# Patient Record
Sex: MALE | Race: WHITE | Employment: OTHER | URBAN - METROPOLITAN AREA
[De-identification: names, ages, dates, MRNs, and addresses within clinical notes are randomized per-mention and may not be internally consistent; named-entity substitution may affect disease eponyms.]

---

## 2019-05-30 ENCOUNTER — NURSING HOME VISIT (OUTPATIENT)
Dept: GERIATRICS | Facility: CLINIC | Age: 74
End: 2019-05-30
Payer: COMMERCIAL

## 2019-05-30 VITALS
RESPIRATION RATE: 18 BRPM | OXYGEN SATURATION: 96 % | SYSTOLIC BLOOD PRESSURE: 108 MMHG | WEIGHT: 113 LBS | HEART RATE: 64 BPM | TEMPERATURE: 98.1 F | DIASTOLIC BLOOD PRESSURE: 64 MMHG

## 2019-05-30 DIAGNOSIS — J44.9 CHRONIC OBSTRUCTIVE PULMONARY DISEASE, UNSPECIFIED COPD TYPE (H): ICD-10-CM

## 2019-05-30 DIAGNOSIS — Z95.0 S/P PLACEMENT OF CARDIAC PACEMAKER: ICD-10-CM

## 2019-05-30 DIAGNOSIS — I25.10 CORONARY ARTERY DISEASE INVOLVING NATIVE HEART, ANGINA PRESENCE UNSPECIFIED, UNSPECIFIED VESSEL OR LESION TYPE: ICD-10-CM

## 2019-05-30 DIAGNOSIS — I50.9 CONGESTIVE HEART FAILURE, UNSPECIFIED HF CHRONICITY, UNSPECIFIED HEART FAILURE TYPE (H): Primary | ICD-10-CM

## 2019-05-30 DIAGNOSIS — Z72.0 TOBACCO ABUSE: ICD-10-CM

## 2019-05-30 DIAGNOSIS — R06.2 WHEEZING: ICD-10-CM

## 2019-05-30 DIAGNOSIS — K21.00 GASTROESOPHAGEAL REFLUX DISEASE WITH ESOPHAGITIS: ICD-10-CM

## 2019-05-30 DIAGNOSIS — N40.0 BENIGN PROSTATIC HYPERPLASIA, UNSPECIFIED WHETHER LOWER URINARY TRACT SYMPTOMS PRESENT: ICD-10-CM

## 2019-05-30 DIAGNOSIS — R41.0 DELIRIUM: ICD-10-CM

## 2019-05-30 DIAGNOSIS — I10 HYPERTENSION, UNSPECIFIED TYPE: ICD-10-CM

## 2019-05-30 DIAGNOSIS — E78.5 HYPERLIPIDEMIA, UNSPECIFIED HYPERLIPIDEMIA TYPE: ICD-10-CM

## 2019-05-30 PROBLEM — I70.90 ATHEROSCLEROSIS OF ARTERY: Status: ACTIVE | Noted: 2019-05-30

## 2019-05-30 PROCEDURE — 99310 SBSQ NF CARE HIGH MDM 45: CPT | Performed by: NURSE PRACTITIONER

## 2019-05-30 RX ORDER — FUROSEMIDE 20 MG
20 TABLET ORAL DAILY
Start: 2019-05-30

## 2019-05-30 RX ORDER — TAMSULOSIN HYDROCHLORIDE 0.4 MG/1
0.4 CAPSULE ORAL DAILY
Start: 2019-05-30

## 2019-05-30 RX ORDER — LISINOPRIL 2.5 MG/1
2.5 TABLET ORAL DAILY
Start: 2019-05-30

## 2019-05-30 RX ORDER — NITROGLYCERIN 0.4 MG/1
TABLET SUBLINGUAL
Start: 2019-05-30

## 2019-05-30 RX ORDER — QUETIAPINE FUMARATE 25 MG/1
50 TABLET, FILM COATED ORAL
Start: 2019-06-06 | End: 2019-06-06

## 2019-05-30 RX ORDER — TIOTROPIUM BROMIDE 18 UG/1
18 CAPSULE ORAL; RESPIRATORY (INHALATION) DAILY
Start: 2019-05-30

## 2019-05-30 RX ORDER — METOPROLOL SUCCINATE 50 MG/1
75 TABLET, EXTENDED RELEASE ORAL 2 TIMES DAILY
Start: 2019-05-30 | End: 2019-06-03

## 2019-05-30 RX ORDER — ATORVASTATIN CALCIUM 20 MG/1
40 TABLET, FILM COATED ORAL DAILY
Start: 2019-05-30

## 2019-05-30 RX ORDER — ALBUTEROL SULFATE 90 UG/1
2 AEROSOL, METERED RESPIRATORY (INHALATION) EVERY 4 HOURS PRN
Qty: 8.5 G
Start: 2019-05-30 | End: 2019-06-06

## 2019-05-30 RX ORDER — IPRATROPIUM BROMIDE AND ALBUTEROL SULFATE 2.5; .5 MG/3ML; MG/3ML
1 SOLUTION RESPIRATORY (INHALATION) 4 TIMES DAILY
Start: 2019-05-30 | End: 2019-06-03

## 2019-05-30 RX ORDER — METAPROTERENOL SULFATE 10 MG
1000 TABLET ORAL DAILY
Start: 2019-05-30

## 2019-05-30 RX ORDER — BUDESONIDE AND FORMOTEROL FUMARATE DIHYDRATE 160; 4.5 UG/1; UG/1
2 AEROSOL RESPIRATORY (INHALATION) 2 TIMES DAILY
Start: 2019-05-30

## 2019-05-30 RX ORDER — AMIODARONE HYDROCHLORIDE 400 MG/1
400 TABLET ORAL DAILY
Start: 2019-05-30

## 2019-05-30 RX ORDER — QUETIAPINE FUMARATE 25 MG/1
50 TABLET, FILM COATED ORAL DAILY
Qty: 14 TABLET | Refills: 0
Start: 2019-05-30 | End: 2019-06-06

## 2019-05-30 RX ORDER — OMEPRAZOLE 20 MG/1
20 TABLET, DELAYED RELEASE ORAL DAILY
Start: 2019-05-30

## 2019-05-30 RX ORDER — ACETAMINOPHEN 325 MG/1
650 TABLET ORAL EVERY 6 HOURS PRN
Start: 2019-05-30

## 2019-05-30 NOTE — PROGRESS NOTES
[place] [facility]  PRIMARY CARE PROVIDER AND CLINIC:  [facility], [address] / [facility] [address]  Chief Complaint   Patient presents with     Hospital F/U     [facility] Medical Record Number:  [id number]  Place of Service where encounter took place:  [facility] ([facility]) [[id number]]    Andrei Almanza  is a 73 year old  (1945), admitted to the above facility from  [facility]. Hospital stay 5/10/19 through 5/29/19..  Admitted to this facility for  rehab, medical management and nursing care.    HPI:    HPI information obtained from: facility chart records, facility staff, patient report, [facility] chart review and Care Everywhere [facility] chart review.   Brief Summary of Hospital Course: Andrei Almanza has a past medical history which is essentially unknown, as his primary clinic is the [facility] and he was hospitalized at [facility] in [facility].  He reports he was driving back from his cabin in [place] and they stopped at the [employer] in [place] to eat.  He reports he must have passed out there, is unclear what happened to him.  S/he was admitted to the hospital and presumed to have MI.  It appears that a PPM was placed but he is not sure if this is a defibrillator as well. Limited documents are available to me-essentially only a medication list was sent with him.  [facility] apparently does not participate in Care Everywhere, and no information from [facility] was obtained via Care Everywhere either. Past records from [facility] and [facility] have been requested.      Updates on Status Since Skilled nursing Admission: Andrei reports no new concerns.  He reports he slept well last night without difficulty, no chest pain, no cough, no congestion.  He reports he is a former smoker, now on nicotine patch and without symptoms of withdrawal.  He has a you cath bag with orders to do a voiding trial in about 1 week. He reports he had a BM.  Nursing reports no new  concerns.      CODE STATUS/ADVANCE DIRECTIVES DISCUSSION:   CPR/Full code   Patient's living condition: lives with spouse  ALLERGIES: Patient has no known allergies.  PAST MEDICAL HISTORY:  has a past medical history of BPH (benign prostatic hyperplasia), CAD (coronary artery disease), Chest pain, COPD (chronic obstructive pulmonary disease) (H), GERD (gastroesophageal reflux disease), HLD (hyperlipidemia), and HTN (hypertension).  PAST SURGICAL HISTORY:   has no past surgical history on file.  FAMILY HISTORY: family history is not on file.  SOCIAL HISTORY:   reports that he has been smoking.  He does not have any smokeless tobacco history on file. He reports that he does not drink alcohol or use drugs.    Post Discharge Medication Reconciliation Status: discharge medications reconciled and changed, per note/orders (see AVS)  Current Outpatient Medications   Medication Sig Dispense Refill     ASPIRIN PO Take 81 mg by mouth daily         ROS:  10 point ROS of systems including Constitutional, Eyes, Respiratory, Cardiovascular, Gastroenterology, Genitourinary, Integumentary, Musculoskeletal, Psychiatric were all negative except for pertinent positives noted in my HPI.    Vitals:  /64   Pulse 64   Temp 98.1  F (36.7  C)   Resp 18   Wt 51.3 kg (113 lb)   SpO2 96%   Exam:  GENERAL APPEARANCE:  Alert, in no distress, thin, alert  HEAD:  Normal, normocephalic, atraumatic  EYE EXAM: normal external eye, conjunctiva, lids, ROLY  NECK EXAM: supple, no JVD  CHEST/RESP:  respiratory effort normal, lung sounds CTA , no respiratory distress  CV:  Rate reg, rhythm reg, no murmur, no peripheral edema   M/S:   extremities normal, gait abnormal-walking with rolling walker, weak, kyphotic, normal muscle tone, and range of motion normal   SKIN EXAM: healing incision over L upper chest wall, about 5 cm in length, glued and without erythema or drainage, mild surrounding bruising  NEUROLOGIC EXAM: Normal gross motor movement,  tone and coordination. No tremor. Cranial nerves 2-12 are normal tested and grossly at patient's baseline  PSYCH:  Alert and oriented to person-place-time with some apparent memory loss, affect pleasant , judgement appropriate - following restrictions.      Lab/Diagnostic data:  no labs available to review    ASSESSMENT/PLAN:  Congestive heart failure, unspecified HF chronicity, unspecified heart failure type (H)  Coronary artery disease involving native heart, angina presence unspecified, unspecified vessel or lesion type  S/P placement of cardiac pacemaker  Patient with past medical history significant for CHF who had an unresponsive episode out of town, at [facility] for 19 days.  Apparently had PPM placed, unclear next steps as discharge papers are lacking.  He is on amiodarone, aspirin, lasix, metoprolol, statin, lisinopril.  He is having no chest pain today, weak.    -start therapy, with precautions per usual for ppm placement (no lifting x 10 pounds, no L arm over head)  -monitor vital signs, for changes  -obtain old records stat from [facility] and [facility]    Chronic obstructive pulmonary disease, unspecified COPD type (H)  Wheezing  Patient with apparent history of COPD, on prednisone (unclear if this is new or chronic), spiriva, symbicort, duoneb prn, albuterol inhaler prn.  Without symptoms today, and has a wilton patch in place to promote smoking cessation.     Benign prostatic hyperplasia, unspecified whether lower urinary tract symptoms present  Unclear if this is an old or new problem, currently has indwelling you cath, and is now on tamsulosin.  He cannot remember if he was on this in the past or not.  Awaiting records, will continue tamsulosin for now and monitor.  He is not symptomatic at this time.     Hyperlipidemia, unspecified hyperlipidemia type  On statin, unclear if this is new medication or old.     Hypertension, unspecified type  On lisinopril, lasix, metoprolol.  BP trending low  today.  Will monitor. May need to adjust medications.      Gastroesophageal reflux disease with esophagitis  On omeprazole, stable without symptoms today    Delirium  He is on seroquel for 7 days.  Unclear if this is old or new.  Will continue for now, suspect some hospital induced delirium.  Awaiting records.        transcribed by : Sheeba Haddad    1. Please obtain medical records from [facility] clinic  2. Starting 6/6/19 Seroquel 50 mg po at bedtime prn x 14 days Dx delirium  3. Diagnosis clarified on OSR  4. Labs 6/3 CMP and CBC Dx HTN, CAD, anemia  5. Please obtain [facility] discharge summary    Electronically signed by:  RADHA Miller CNP

## 2019-05-30 NOTE — LETTER
5/30/2019        RE: Andrei Almanza  [address] [facility] [address]        [place] [facility]  PRIMARY CARE PROVIDER AND CLINIC:  [facility], [address] / [facility] [address]  Chief Complaint   Patient presents with     Hospital F/U     [facility] Record Number:  [id number]  Place of Service where encounter took place:  [facility] ([facility]) [[id number]]    Andrei Almanza  is a 73 year old  (1945), admitted to the above facility from  [facility]. Hospital stay 5/10/19 through 5/29/19..  Admitted to this facility for  rehab, medical management and nursing care.    HPI:    HPI information obtained from: facility chart records, facility staff, patient report, [facility] chart review and Care Everywhere [facility] chart review.   Brief Summary of Hospital Course: Andrei Almanza has a past medical history which is essentially unknown, as his primary clinic is the [facility] and he was hospitalized at [facility] in [facility].  He reports he was driving back from his cabin in [place] and they stopped at the [employer] in [place] to eat.  He reports he must have passed out there, is unclear what happened to him.  S/he was admitted to the hospital and presumed to have MI.  It appears that a PPM was placed but he is not sure if this is a defibrillator as well. Limited documents are available to me-essentially only a medication list was sent with him.  [facility] apparently does not participate in Care Everywhere, and no information from [facility] was obtained via Care Everywhere either. Past records from [facility] and [facility] have been requested.      Updates on Status Since Skilled nursing Admission: Andrei reports no new concerns.  He reports he slept well last night without difficulty, no chest pain, no cough, no congestion.  He reports he is a former smoker, now on nicotine patch and without symptoms of withdrawal.  He has a you cath bag with  orders to do a voiding trial in about 1 week. He reports he had a BM.  Nursing reports no new concerns.      CODE STATUS/ADVANCE DIRECTIVES DISCUSSION:   CPR/Full code   Patient's living condition: lives with spouse  ALLERGIES: Patient has no known allergies.  PAST MEDICAL HISTORY:  has a past medical history of BPH (benign prostatic hyperplasia), CAD (coronary artery disease), Chest pain, COPD (chronic obstructive pulmonary disease) (H), GERD (gastroesophageal reflux disease), HLD (hyperlipidemia), and HTN (hypertension).  PAST SURGICAL HISTORY:   has no past surgical history on file.  FAMILY HISTORY: family history is not on file.  SOCIAL HISTORY:   reports that he has been smoking.  He does not have any smokeless tobacco history on file. He reports that he does not drink alcohol or use drugs.    Post Discharge Medication Reconciliation Status: discharge medications reconciled and changed, per note/orders (see AVS)  Current Outpatient Medications   Medication Sig Dispense Refill     ASPIRIN PO Take 81 mg by mouth daily         ROS:  10 point ROS of systems including Constitutional, Eyes, Respiratory, Cardiovascular, Gastroenterology, Genitourinary, Integumentary, Musculoskeletal, Psychiatric were all negative except for pertinent positives noted in my HPI.    Vitals:  /64   Pulse 64   Temp 98.1  F (36.7  C)   Resp 18   Wt 51.3 kg (113 lb)   SpO2 96%   Exam:  GENERAL APPEARANCE:  Alert, in no distress, thin, alert  HEAD:  Normal, normocephalic, atraumatic  EYE EXAM: normal external eye, conjunctiva, lids, ROLY  NECK EXAM: supple, no JVD  CHEST/RESP:  respiratory effort normal, lung sounds CTA , no respiratory distress  CV:  Rate reg, rhythm reg, no murmur, no peripheral edema   M/S:   extremities normal, gait abnormal-walking with rolling walker, weak, kyphotic, normal muscle tone, and range of motion normal   SKIN EXAM: healing incision over L upper chest wall, about 5 cm in length, glued and without  erythema or drainage, mild surrounding bruising  NEUROLOGIC EXAM: Normal gross motor movement, tone and coordination. No tremor. Cranial nerves 2-12 are normal tested and grossly at patient's baseline  PSYCH:  Alert and oriented to person-place-time with some apparent memory loss, affect pleasant , judgement appropriate - following restrictions.      Lab/Diagnostic data:  no labs available to review    ASSESSMENT/PLAN:  Congestive heart failure, unspecified HF chronicity, unspecified heart failure type (H)  Coronary artery disease involving native heart, angina presence unspecified, unspecified vessel or lesion type  S/P placement of cardiac pacemaker  Patient with past medical history significant for CHF who had an unresponsive episode out of town, at [facility] for 19 days.  Apparently had PPM placed, unclear next steps as discharge papers are lacking.  He is on amiodarone, aspirin, lasix, metoprolol, statin, lisinopril.  He is having no chest pain today, weak.    -start therapy, with precautions per usual for ppm placement (no lifting x 10 pounds, no L arm over head)  -monitor vital signs, for changes  -obtain old records stat from [facility] and [facility]    Chronic obstructive pulmonary disease, unspecified COPD type (H)  Wheezing  Patient with apparent history of COPD, on prednisone (unclear if this is new or chronic), spiriva, symbicort, duoneb prn, albuterol inhaler prn.  Without symptoms today, and has a wilton patch in place to promote smoking cessation.     Benign prostatic hyperplasia, unspecified whether lower urinary tract symptoms present  Unclear if this is an old or new problem, currently has indwelling you cath, and is now on tamsulosin.  He cannot remember if he was on this in the past or not.  Awaiting records, will continue tamsulosin for now and monitor.  He is not symptomatic at this time.     Hyperlipidemia, unspecified hyperlipidemia type  On statin, unclear if this is new medication or  old.     Hypertension, unspecified type  On lisinopril, lasix, metoprolol.  BP trending low today.  Will monitor. May need to adjust medications.      Gastroesophageal reflux disease with esophagitis  On omeprazole, stable without symptoms today    Delirium  He is on seroquel for 7 days.  Unclear if this is old or new.  Will continue for now, suspect some hospital induced delirium.  Awaiting records.        transcribed by : Sheeba Haddad    1. Please obtain medical records from [facility] clinic  2. Starting 6/6/19 Seroquel 50 mg po at bedtime prn x 14 days Dx delirium  3. Diagnosis clarified on OSR  4. Labs 6/3 CMP and CBC Dx HTN, CAD, anemia  5. Please obtain [facility] discharge summary    Electronically signed by:  RADHA Miller CNP                         Sincerely,        RADHA Miller CNP

## 2019-06-03 ENCOUNTER — NURSING HOME VISIT (OUTPATIENT)
Dept: GERIATRICS | Facility: CLINIC | Age: 74
End: 2019-06-03
Payer: COMMERCIAL

## 2019-06-03 VITALS
DIASTOLIC BLOOD PRESSURE: 59 MMHG | RESPIRATION RATE: 18 BRPM | TEMPERATURE: 97.9 F | WEIGHT: 113 LBS | HEART RATE: 77 BPM | OXYGEN SATURATION: 97 % | SYSTOLIC BLOOD PRESSURE: 97 MMHG

## 2019-06-03 DIAGNOSIS — J44.9 CHRONIC OBSTRUCTIVE PULMONARY DISEASE, UNSPECIFIED COPD TYPE (H): ICD-10-CM

## 2019-06-03 DIAGNOSIS — I25.10 CORONARY ARTERY DISEASE INVOLVING NATIVE HEART, ANGINA PRESENCE UNSPECIFIED, UNSPECIFIED VESSEL OR LESION TYPE: Primary | ICD-10-CM

## 2019-06-03 DIAGNOSIS — R33.9 URINARY RETENTION: ICD-10-CM

## 2019-06-03 DIAGNOSIS — I10 HYPERTENSION, UNSPECIFIED TYPE: ICD-10-CM

## 2019-06-03 PROCEDURE — 99309 SBSQ NF CARE MODERATE MDM 30: CPT | Performed by: NURSE PRACTITIONER

## 2019-06-03 RX ORDER — METOPROLOL SUCCINATE 50 MG/1
75 TABLET, EXTENDED RELEASE ORAL DAILY
Start: 2019-06-03

## 2019-06-03 RX ORDER — IPRATROPIUM BROMIDE AND ALBUTEROL SULFATE 2.5; .5 MG/3ML; MG/3ML
1 SOLUTION RESPIRATORY (INHALATION) EVERY 4 HOURS PRN
Start: 2019-06-03 | End: 2019-06-06

## 2019-06-03 NOTE — LETTER
6/3/2019        RE: Andrei Almanza  [address] [facility] [address]        [place] [facility]  [place] Medical Record Number:  [id number]  Place of Service where encounter took place:  [facility] ([facility]) [[id number]]  Chief Complaint   Patient presents with     Nursing Home Acute       HPI:    Andrei Almanza  is a 73 year old (1945), who is being seen today for an episodic care visit.  HPI information obtained from: facility chart records, facility staff, patient report and [facility] chart review. Today's concern is:     Coronary artery disease involving native heart, angina presence unspecified, unspecified vessel or lesion type  Hypertension, unspecified type  Urinary retention  Chronic obstructive pulmonary disease, unspecified COPD type (H)   Andrei reports he would like to discharge to home soon, feeling well.  Nursing reports no new concerns but does still have you cath, still on q4h duonebs.       Past Medical and Surgical History reviewed in Epic today.    MEDICATIONS:  Current Outpatient Medications   Medication Sig Dispense Refill     acetaminophen (TYLENOL) 325 MG tablet Take 2 tablets (650 mg) by mouth every 6 hours as needed for mild pain       albuterol (PROAIR HFA/PROVENTIL HFA/VENTOLIN HFA) 108 (90 Base) MCG/ACT inhaler Inhale 2 puffs into the lungs every 4 hours as needed for shortness of breath / dyspnea or wheezing 8.5 g      amiodarone (PACERONE) 400 MG tablet Take 1 tablet (400 mg) by mouth daily       ASPIRIN PO Take 81 mg by mouth daily       atorvastatin (LIPITOR) 20 MG tablet Take 2 tablets (40 mg) by mouth daily       budesonide-formoterol (SYMBICORT) 160-4.5 MCG/ACT Inhaler Inhale 2 puffs into the lungs 2 times daily       furosemide (LASIX) 20 MG tablet Take 1 tablet (20 mg) by mouth daily       lisinopril (PRINIVIL/ZESTRIL) 2.5 MG tablet Take 1 tablet (2.5 mg) by mouth daily       magnesium oxide (MAG-OX) 400 (241.3 Mg) MG tablet  Take 1 tablet (400 mg) by mouth daily       nicotine (NICODERM CQ) 7 MG/24HR 24 hr patch Place 1 patch onto the skin every 24 hours       nitroGLYcerin (NITROSTAT) 0.4 MG sublingual tablet For chest pain place 1 tablet under the tongue every 5 minutes for 3 doses. If symptoms persist 5 minutes after 1st dose call 911.       Omega-3 Fish Oil 500 MG capsule Take 2 capsules (1,000 mg) by mouth daily       omeprazole (PRILOSEC OTC) 20 MG EC tablet Take 1 tablet (20 mg) by mouth daily       QUEtiapine (SEROQUEL) 25 MG tablet Take 2 tablets (50 mg) by mouth daily for 7 days 14 tablet 0     [START ON 6/6/2019] QUEtiapine (SEROQUEL) 25 MG tablet Take 2 tablets (50 mg) by mouth nightly as needed (delirium)       tamsulosin (FLOMAX) 0.4 MG capsule Take 1 capsule (0.4 mg) by mouth daily       tiotropium (SPIRIVA) 18 MCG inhaled capsule Inhale 1 capsule (18 mcg) into the lungs daily         REVIEW OF SYSTEMS:  4 point ROS including Respiratory, CV, GI and , other than that noted in the HPI,  is negative    Objective:  BP 97/59   Pulse 77   Temp 97.9  F (36.6  C)   Resp 18   Wt 51.3 kg (113 lb)   SpO2 97%   Exam:  GENERAL APPEARANCE:  Alert, in no distress   HEAD:  Normal, normocephalic, atraumatic  EYE EXAM: normal external eye, conjunctiva, lids, ROLY  NECK EXAM: supple, no JVD  CHEST/RESP:  respiratory effort normal, lung sounds CTA , no respiratory distress  CV:  Rate reg, rhythm reg, no murmur, no peripheral edema   M/S:   extremities normal, gait normal-with rolling walker , normal muscle tone, and range of motion normal   NEUROLOGIC EXAM: Normal gross motor movement, tone and coordination. No tremor. Cranial nerves 2-12 are normal tested and grossly at patient's baseline  PSYCH:  Alert and oriented to person-place-time , affect pleasant , judgement appropriate      Labs:   None     ASSESSMENT/PLAN:  Coronary artery disease involving native heart, angina presence unspecified, unspecified vessel or lesion type  With  new ICD implanted and still have not obtained discharge summary from [facility] in [place].  Unclear exact history, but ultimately patient is feeling well and improving in strength and stamina.   -continue therapy and current medical management, progressing     Hypertension, unspecified type  BP trending low, on lisinopril, metoprolol.  Will decrease metoprolol, monitor HR and BP.   BP Readings from Last 6 Encounters:   06/03/19 97/59   05/30/19 108/64     Pulse Readings from Last 4 Encounters:   06/03/19 77   05/30/19 64        Urinary retention  Still with indwelling you but is feeling urge to urinate.  He would like to go home at the end of the week.  -discontinue you, voiding trial per protocol on [facility]  -update NP if unsuccessful    Chronic obstructive pulmonary disease, unspecified COPD type (H)  Patient with known history of smoking, no recent exacerbations.  He cannot remember his medications, does not think he was on inhalers.  He has been on q4h duonebs since admission, with no increase in dyspnea.  Will discontinue scheduled duonebs, monitor dyspnea, symptoms.        transcribed by : Sheeba Haddad  1. Discontinue scheduled duonebs  2. duonebs Q 4 hr prn wheezing and sob  3. Discontinue metoprolol 75 BID  4. Metoprolol succinate 75 mg po every day Dx HTN  5. Start voiding trial now 6/3/19 - pull you cath, push fluids and monitor voiding and PVR, straight cath if > 350 ml urine after voiding    Electronically signed by:  RADHA Miller CNP               Sincerely,        RADHA Miller CNP

## 2019-06-03 NOTE — PROGRESS NOTES
[place] [facility]  [place] Medical Record Number:  [id number]  Place of Service where encounter took place:  [facility] ([facility]) [[id number]]  Chief Complaint   Patient presents with     Nursing Home Acute       HPI:    Andrei Almanza  is a 73 year old (1945), who is being seen today for an episodic care visit.  HPI information obtained from: facility chart records, facility staff, patient report and [facility] chart review. Today's concern is:     Coronary artery disease involving native heart, angina presence unspecified, unspecified vessel or lesion type  Hypertension, unspecified type  Urinary retention  Chronic obstructive pulmonary disease, unspecified COPD type (H)   Andrei reports he would like to discharge to home soon, feeling well.  Nursing reports no new concerns but does still have you cath, still on q4h duonebs.       Past Medical and Surgical History reviewed in Epic today.    MEDICATIONS:  Current Outpatient Medications   Medication Sig Dispense Refill     acetaminophen (TYLENOL) 325 MG tablet Take 2 tablets (650 mg) by mouth every 6 hours as needed for mild pain       albuterol (PROAIR HFA/PROVENTIL HFA/VENTOLIN HFA) 108 (90 Base) MCG/ACT inhaler Inhale 2 puffs into the lungs every 4 hours as needed for shortness of breath / dyspnea or wheezing 8.5 g      amiodarone (PACERONE) 400 MG tablet Take 1 tablet (400 mg) by mouth daily       ASPIRIN PO Take 81 mg by mouth daily       atorvastatin (LIPITOR) 20 MG tablet Take 2 tablets (40 mg) by mouth daily       budesonide-formoterol (SYMBICORT) 160-4.5 MCG/ACT Inhaler Inhale 2 puffs into the lungs 2 times daily       furosemide (LASIX) 20 MG tablet Take 1 tablet (20 mg) by mouth daily       lisinopril (PRINIVIL/ZESTRIL) 2.5 MG tablet Take 1 tablet (2.5 mg) by mouth daily       magnesium oxide (MAG-OX) 400 (241.3 Mg) MG tablet Take 1 tablet (400 mg) by mouth daily       nicotine (NICODERM CQ) 7 MG/24HR 24 hr patch Place 1  patch onto the skin every 24 hours       nitroGLYcerin (NITROSTAT) 0.4 MG sublingual tablet For chest pain place 1 tablet under the tongue every 5 minutes for 3 doses. If symptoms persist 5 minutes after 1st dose call 911.       Omega-3 Fish Oil 500 MG capsule Take 2 capsules (1,000 mg) by mouth daily       omeprazole (PRILOSEC OTC) 20 MG EC tablet Take 1 tablet (20 mg) by mouth daily       QUEtiapine (SEROQUEL) 25 MG tablet Take 2 tablets (50 mg) by mouth daily for 7 days 14 tablet 0     [START ON 6/6/2019] QUEtiapine (SEROQUEL) 25 MG tablet Take 2 tablets (50 mg) by mouth nightly as needed (delirium)       tamsulosin (FLOMAX) 0.4 MG capsule Take 1 capsule (0.4 mg) by mouth daily       tiotropium (SPIRIVA) 18 MCG inhaled capsule Inhale 1 capsule (18 mcg) into the lungs daily         REVIEW OF SYSTEMS:  4 point ROS including Respiratory, CV, GI and , other than that noted in the HPI,  is negative    Objective:  BP 97/59   Pulse 77   Temp 97.9  F (36.6  C)   Resp 18   Wt 51.3 kg (113 lb)   SpO2 97%   Exam:  GENERAL APPEARANCE:  Alert, in no distress   HEAD:  Normal, normocephalic, atraumatic  EYE EXAM: normal external eye, conjunctiva, lids, ROLY  NECK EXAM: supple, no JVD  CHEST/RESP:  respiratory effort normal, lung sounds CTA , no respiratory distress  CV:  Rate reg, rhythm reg, no murmur, no peripheral edema   M/S:   extremities normal, gait normal-with rolling walker , normal muscle tone, and range of motion normal   NEUROLOGIC EXAM: Normal gross motor movement, tone and coordination. No tremor. Cranial nerves 2-12 are normal tested and grossly at patient's baseline  PSYCH:  Alert and oriented to person-place-time , affect pleasant , judgement appropriate      Labs:   None     ASSESSMENT/PLAN:  Coronary artery disease involving native heart, angina presence unspecified, unspecified vessel or lesion type  With new ICD implanted and still have not obtained discharge summary from [facility] in [place].   Unclear exact history, but ultimately patient is feeling well and improving in strength and stamina.   -continue therapy and current medical management, progressing     Hypertension, unspecified type  BP trending low, on lisinopril, metoprolol.  Will decrease metoprolol, monitor HR and BP.   BP Readings from Last 6 Encounters:   06/03/19 97/59   05/30/19 108/64     Pulse Readings from Last 4 Encounters:   06/03/19 77   05/30/19 64        Urinary retention  Still with indwelling you but is feeling urge to urinate.  He would like to go home at the end of the week.  -discontinue you, voiding trial per protocol on [facility]  -update NP if unsuccessful    Chronic obstructive pulmonary disease, unspecified COPD type (H)  Patient with known history of smoking, no recent exacerbations.  He cannot remember his medications, does not think he was on inhalers.  He has been on q4h duonebs since admission, with no increase in dyspnea.  Will discontinue scheduled duonebs, monitor dyspnea, symptoms.        transcribed by : Sheeba Haddad  1. Discontinue scheduled duonebs  2. duonebs Q 4 hr prn wheezing and sob  3. Discontinue metoprolol 75 BID  4. Metoprolol succinate 75 mg po every day Dx HTN  5. Start voiding trial now 6/3/19 - pull you cath, push fluids and monitor voiding and PVR, straight cath if > 350 ml urine after voiding    Electronically signed by:  RADHA Miller CNP

## 2019-06-06 ENCOUNTER — DISCHARGE SUMMARY NURSING HOME (OUTPATIENT)
Dept: GERIATRICS | Facility: CLINIC | Age: 74
End: 2019-06-06
Payer: COMMERCIAL

## 2019-06-06 VITALS
WEIGHT: 122 LBS | TEMPERATURE: 97.9 F | RESPIRATION RATE: 18 BRPM | DIASTOLIC BLOOD PRESSURE: 78 MMHG | SYSTOLIC BLOOD PRESSURE: 126 MMHG | HEART RATE: 75 BPM | OXYGEN SATURATION: 99 %

## 2019-06-06 DIAGNOSIS — N40.0 BENIGN PROSTATIC HYPERPLASIA, UNSPECIFIED WHETHER LOWER URINARY TRACT SYMPTOMS PRESENT: ICD-10-CM

## 2019-06-06 DIAGNOSIS — Z72.0 TOBACCO ABUSE: ICD-10-CM

## 2019-06-06 DIAGNOSIS — J44.9 CHRONIC OBSTRUCTIVE PULMONARY DISEASE, UNSPECIFIED COPD TYPE (H): ICD-10-CM

## 2019-06-06 DIAGNOSIS — I50.9 CONGESTIVE HEART FAILURE, UNSPECIFIED HF CHRONICITY, UNSPECIFIED HEART FAILURE TYPE (H): ICD-10-CM

## 2019-06-06 DIAGNOSIS — I25.10 CORONARY ARTERY DISEASE INVOLVING NATIVE HEART, ANGINA PRESENCE UNSPECIFIED, UNSPECIFIED VESSEL OR LESION TYPE: ICD-10-CM

## 2019-06-06 DIAGNOSIS — I46.9 CARDIAC ARREST (H): Primary | ICD-10-CM

## 2019-06-06 DIAGNOSIS — Z95.810 ICD (IMPLANTABLE CARDIOVERTER-DEFIBRILLATOR) IN PLACE: ICD-10-CM

## 2019-06-06 DIAGNOSIS — R41.0 DELIRIUM: ICD-10-CM

## 2019-06-06 DIAGNOSIS — I10 HYPERTENSION, UNSPECIFIED TYPE: ICD-10-CM

## 2019-06-06 DIAGNOSIS — E78.5 HYPERLIPIDEMIA, UNSPECIFIED HYPERLIPIDEMIA TYPE: ICD-10-CM

## 2019-06-06 DIAGNOSIS — R33.9 URINARY RETENTION: ICD-10-CM

## 2019-06-06 PROCEDURE — 99316 NF DSCHRG MGMT 30 MIN+: CPT | Performed by: NURSE PRACTITIONER

## 2019-06-06 NOTE — LETTER
6/6/2019        RE: Andrei Almanza  [address] [facility] [address]        Home GERIATRIC SERVICES DISCHARGE SUMMARY  PATIENT'S NAME: Andrei Almanza  YOB: 1945  MEDICAL RECORD NUMBER:  [id number]  Place of Service where encounter took place:  [facility] [[id number]]    PRIMARY CARE PROVIDER AND CLINIC RESPONSIBLE AFTER TRANSFER:   [facility], [address] / [facility] [address]    Non-FMG Provider     Transferring providers: RADHA Miller CNP  Recent Hospitalization/ED:  [facility] stay 5/10/19 to 5/29/19.  Date of SNF Admission: May / 29 / 2019  Date of SNF (anticipated) Discharge: June / 07 / 2019  Discharged to: previous independent home  Cognitive Scores: SLUMS: 22/30  Physical Function: Ambulating 200 ft with 4 wheeled walker  DME: Walker    CODE STATUS/ADVANCE DIRECTIVES DISCUSSION:  Full Code   ALLERGIES: Patient has no known allergies.    Copied from hospital discharge summary ([place], [place])  for convenience of PCP in arranging follow up care.       DISCHARGE DIAGNOSIS/NURSING FACILITY COURSE:   Cardiac arrest (H)  Patient suffered a witnessed cardiac arrest at [facility] in [place].  He and his wife were driving back from their [place] cabin, and had stopped for coffee.  He suddenly fell face forward in his food, and had complete cardiac arrest.  Bystanders started CPR until EMTs arrived.  He was transported to hospital.  He was intubated and gradually improved.  Had ICD placed 5/2019.  His hospital stay was complicated by infiltrates on CXR-had antibiotics, steroids as well as urinary retention, encephalopathy.   -on amiodarone, statin, metoprolol  -home care RN, PT, OT  -will need cardiac rehab after home care is done    ICD (implantable cardioverter-defibrillator) in place  S/P ICD placed and incision is well healed.  He has documentation with him that identifies it.  -cardiology  referral to determine next steps, establish care    Congestive heart failure, unspecified HF chronicity, unspecified heart failure type (H)  Noted to have EF 30-35% with global hypokinesis.  On lasix without edema, no dyspnea.   -cardiology referral    Coronary artery disease involving native heart, angina presence unspecified, unspecified vessel or lesion type  S/P CAD and stents 10 years ago.  This history is not available to me.     Chronic obstructive pulmonary disease, unspecified COPD type (H)  Patient with history of smoking, on symbicort, spiriva.  Was admitted to TCU on duonebs, tolerated discontinuation of these in TCU and is asymptomatic.     Hypertension, unspecified type  Patient with history of HTN, on lisinopril, metoprolol. Stable now.   BP Readings from Last 6 Encounters:   06/06/19 126/78   06/03/19 97/59   05/30/19 108/64   02/11/15 109/74   04/16/14 130/78      Urinary retention  Benign prostatic hyperplasia, unspecified whether lower urinary tract symptoms present  Prior to cardiac arrest, patient reports no difficulty with urination.  During hospital stay, developed urinary retention and you was placed.  He has now tolerated removal of you and is urinating without difficulty.     Hyperlipidemia, unspecified hyperlipidemia type  On statin.  No recent labwork available. Will defer to PCP for ongoing management.     Delirium  Patient developed encephalopathy during hospital stay, now has tolerated discontinuation of seroquel and is alert, oriented, sleeping well.     Tobacco abuse  Patient previously smoking 0.5 ppd, no smoking since cardiac arrest.  He has been on nicotine patch, and is interested in complete smoking cessation. Instructed to continue nicotine patch for about 1 month.       Past Medical History:  has a past medical history of BPH (benign prostatic hyperplasia), CAD (coronary artery disease), Chest pain, COPD (chronic obstructive pulmonary disease) (H), GERD (gastroesophageal  reflux disease), HLD (hyperlipidemia), and HTN (hypertension).    Discharge Medications:  Current Outpatient Medications   Medication Sig Dispense Refill     acetaminophen (TYLENOL) 325 MG tablet Take 2 tablets (650 mg) by mouth every 6 hours as needed for mild pain       amiodarone (PACERONE) 400 MG tablet Take 1 tablet (400 mg) by mouth daily       ASPIRIN PO Take 81 mg by mouth daily       atorvastatin (LIPITOR) 20 MG tablet Take 2 tablets (40 mg) by mouth daily       budesonide-formoterol (SYMBICORT) 160-4.5 MCG/ACT Inhaler Inhale 2 puffs into the lungs 2 times daily       furosemide (LASIX) 20 MG tablet Take 1 tablet (20 mg) by mouth daily       lisinopril (PRINIVIL/ZESTRIL) 2.5 MG tablet Take 1 tablet (2.5 mg) by mouth daily       magnesium oxide (MAG-OX) 400 (241.3 Mg) MG tablet Take 1 tablet (400 mg) by mouth daily       metoprolol succinate ER (TOPROL-XL) 50 MG 24 hr tablet Take 1.5 tablets (75 mg) by mouth daily       nicotine (NICODERM CQ) 7 MG/24HR 24 hr patch Place 1 patch onto the skin every 24 hours       nitroGLYcerin (NITROSTAT) 0.4 MG sublingual tablet For chest pain place 1 tablet under the tongue every 5 minutes for 3 doses. If symptoms persist 5 minutes after 1st dose call 911.       Omega-3 Fish Oil 500 MG capsule Take 2 capsules (1,000 mg) by mouth daily       omeprazole (PRILOSEC OTC) 20 MG EC tablet Take 1 tablet (20 mg) by mouth daily       tamsulosin (FLOMAX) 0.4 MG capsule Take 1 capsule (0.4 mg) by mouth daily       tiotropium (SPIRIVA) 18 MCG inhaled capsule Inhale 1 capsule (18 mcg) into the lungs daily       Medication Changes/Rationale:     seroquel discontinued    Metoprolol decreased to 75 mg daily (previously BID) due to low BP    Controlled medications sent with patient:   not applicable/none     ROS:   4 point ROS including Respiratory, CV, GI and , other than that noted in the HPI,  is negative    Physical Exam:   Vitals: /78   Pulse 75   Temp 97.9  F (36.6  C)    Resp 18   Wt 55.3 kg (122 lb)   SpO2 99%   BMI= There is no height or weight on file to calculate BMI.  GENERAL APPEARANCE:  Alert, in no distress   CHEST/RESP:  respiratory effort normal, lung sounds CTA , no respiratory distress  CV:  Rate reg, rhythm reg, no murmur, no peripheral edema   PSYCH:  Alert and oriented to person-place-time , affect pleasant , judgement appropriate       SNF labs: no labs done at TCU    DISCHARGE PLAN:    Follow up labs: No labs orders/due    Medical Follow Up:      Follow up with primary care provider in 5-7 days    MT referral needed and placed by this provider: No    Current [place] scheduled appointments:   NA    Discharge Services: Home Care:  Occupational Therapy, Physical Therapy, Registered Nurse, Home Health Aide and From:  [facility]    Discharge Instructions Verbalized to Patient at Discharge:     None      1. Discontinue the following- duonebs, alb nebs, MOM bisacodyl, seroquel  2. Patient may discharge to home with all current meds and treatments  3. Please send all meds home with patient.  4. F/U with PCP in 5-7 days - [facility]  5. Okay for [facility] RN/PT/OT/HHA to eval and treat.  6. Cardiac rehab after home care is done.        TOTAL DISCHARGE TIME:   Greater than 30 minutes  Electronically signed by:  RADHA Miller CNP     Documentation of Face to Face and Certification for Home Health Services    I certify that patient: Andrei Almanza is under my care and that I, or a nurse practitioner or physician's assistant working with me, had a face-to-face encounter that meets the physician face-to-face encounter requirements with this patient on: 6/6/2019.    This encounter with the patient was in whole, or in part, for the following medical condition, which is the primary reason for home health care: CHF, Pacemaker placement.    I certify that, based on my findings, the following services are medically necessary home health services:  Nursing, Occupational Therapy, Physical Therapy and HHA.    My clinical findings support the need for the above services because: Nurse is needed: To provide caregiver training to assist with: CHF, Pacemaker placement..., Occupational Therapy Services are needed to assess and treat cognitive ability and address ADL safety due to impairment in CHF, Pacemaker placement.. and Physical Therapy Services are needed to assess and treat the following functional impairments: CHF, Pacemaker placement..    Further, I certify that my clinical findings support that this patient is homebound (i.e. absences from home require considerable and taxing effort and are for medical reasons or Sabianist services or infrequently or of short duration when for other reasons) because: Requires assistance of another person or specialized equipment to access medical services because patient: Requires supervision of another for safe transfer...    Based on the above findings. I certify that this patient is confined to the home and needs intermittent skilled nursing care, physical therapy and/or speech therapy.  The patient is under my care, and I have initiated the establishment of the plan of care.  This patient will be followed by a physician who will periodically review the plan of care.  Physician/Provider to provide follow up care: [facility]    Attending hospital physician (the Medicare certified [facility] provider): RADHA Miller CNP   Physician Signature: See electronic signature associated with these discharge orders.  Date: 6/6/2019                    Sincerely,        RADHA Miller CNP

## 2019-06-06 NOTE — PROGRESS NOTES
[place] [facility]IATRIC [facility] DISCHARGE SUMMARY  PATIENT'S NAME: Andrei Almanza  YOB: 1945  MEDICAL RECORD NUMBER:  [id number]  Place of Service where encounter took place:  [facility] [[id number]]    PRIMARY CARE PROVIDER AND CLINIC RESPONSIBLE AFTER TRANSFER:   [facility], [address] / [facility] [address]    Non-FMG Provider     Transferring providers: RADHA Miller CNP  Recent Hospitalization/ED:  [facility] stay 5/10/19 to 5/29/19.  Date of SNF Admission: May / 29 / 2019  Date of SNF (anticipated) Discharge: June / 07 / 2019  Discharged to: previous independent home  Cognitive Scores: SLUMS: 22/30  Physical Function: Ambulating 200 ft with 4 wheeled walker  DME: Walker    CODE STATUS/ADVANCE DIRECTIVES DISCUSSION:  Full Code   ALLERGIES: Patient has no known allergies.    Copied from hospital discharge summary ([place], [place])  for convenience of PCP in arranging follow up care.       DISCHARGE DIAGNOSIS/NURSING FACILITY COURSE:   Cardiac arrest (H)  Patient suffered a witnessed cardiac arrest at [facility] in [place].  He and his wife were driving back from their [place] cabin, and had stopped for coffee.  He suddenly fell face forward in his food, and had complete cardiac arrest.  Bystanders started CPR until EMTs arrived.  He was transported to hospital.  He was intubated and gradually improved.  Had ICD placed 5/2019.  His hospital stay was complicated by infiltrates on CXR-had antibiotics, steroids as well as urinary retention, encephalopathy.   -on amiodarone, statin, metoprolol  -home care RN, PT, OT  -will need cardiac rehab after home care is done    ICD (implantable cardioverter-defibrillator) in place  S/P ICD placed and incision is well healed.  He has documentation with him that identifies it.  -cardiology referral to determine next steps, establish care    Congestive heart failure, unspecified HF  chronicity, unspecified heart failure type (H)  Noted to have EF 30-35% with global hypokinesis.  On lasix without edema, no dyspnea.   -cardiology referral    Coronary artery disease involving native heart, angina presence unspecified, unspecified vessel or lesion type  S/P CAD and stents 10 years ago.  This history is not available to me.     Chronic obstructive pulmonary disease, unspecified COPD type (H)  Patient with history of smoking, on symbicort, spiriva.  Was admitted to TCU on duonebs, tolerated discontinuation of these in TCU and is asymptomatic.     Hypertension, unspecified type  Patient with history of HTN, on lisinopril, metoprolol. Stable now.   BP Readings from Last 6 Encounters:   06/06/19 126/78   06/03/19 97/59   05/30/19 108/64   02/11/15 109/74   04/16/14 130/78      Urinary retention  Benign prostatic hyperplasia, unspecified whether lower urinary tract symptoms present  Prior to cardiac arrest, patient reports no difficulty with urination.  During hospital stay, developed urinary retention and you was placed.  He has now tolerated removal of you and is urinating without difficulty.     Hyperlipidemia, unspecified hyperlipidemia type  On statin.  No recent labwork available. Will defer to PCP for ongoing management.     Delirium  Patient developed encephalopathy during hospital stay, now has tolerated discontinuation of seroquel and is alert, oriented, sleeping well.     Tobacco abuse  Patient previously smoking 0.5 ppd, no smoking since cardiac arrest.  He has been on nicotine patch, and is interested in complete smoking cessation. Instructed to continue nicotine patch for about 1 month.       Past Medical History:  has a past medical history of BPH (benign prostatic hyperplasia), CAD (coronary artery disease), Chest pain, COPD (chronic obstructive pulmonary disease) (H), GERD (gastroesophageal reflux disease), HLD (hyperlipidemia), and HTN (hypertension).    Discharge  Medications:  Current Outpatient Medications   Medication Sig Dispense Refill     acetaminophen (TYLENOL) 325 MG tablet Take 2 tablets (650 mg) by mouth every 6 hours as needed for mild pain       amiodarone (PACERONE) 400 MG tablet Take 1 tablet (400 mg) by mouth daily       ASPIRIN PO Take 81 mg by mouth daily       atorvastatin (LIPITOR) 20 MG tablet Take 2 tablets (40 mg) by mouth daily       budesonide-formoterol (SYMBICORT) 160-4.5 MCG/ACT Inhaler Inhale 2 puffs into the lungs 2 times daily       furosemide (LASIX) 20 MG tablet Take 1 tablet (20 mg) by mouth daily       lisinopril (PRINIVIL/ZESTRIL) 2.5 MG tablet Take 1 tablet (2.5 mg) by mouth daily       magnesium oxide (MAG-OX) 400 (241.3 Mg) MG tablet Take 1 tablet (400 mg) by mouth daily       metoprolol succinate ER (TOPROL-XL) 50 MG 24 hr tablet Take 1.5 tablets (75 mg) by mouth daily       nicotine (NICODERM CQ) 7 MG/24HR 24 hr patch Place 1 patch onto the skin every 24 hours       nitroGLYcerin (NITROSTAT) 0.4 MG sublingual tablet For chest pain place 1 tablet under the tongue every 5 minutes for 3 doses. If symptoms persist 5 minutes after 1st dose call 911.       Omega-3 Fish Oil 500 MG capsule Take 2 capsules (1,000 mg) by mouth daily       omeprazole (PRILOSEC OTC) 20 MG EC tablet Take 1 tablet (20 mg) by mouth daily       tamsulosin (FLOMAX) 0.4 MG capsule Take 1 capsule (0.4 mg) by mouth daily       tiotropium (SPIRIVA) 18 MCG inhaled capsule Inhale 1 capsule (18 mcg) into the lungs daily       Medication Changes/Rationale:     seroquel discontinued    Metoprolol decreased to 75 mg daily (previously BID) due to low BP    Controlled medications sent with patient:   not applicable/none     ROS:   4 point ROS including Respiratory, CV, GI and , other than that noted in the HPI,  is negative    Physical Exam:   Vitals: /78   Pulse 75   Temp 97.9  F (36.6  C)   Resp 18   Wt 55.3 kg (122 lb)   SpO2 99%   BMI= There is no height or weight  on file to calculate BMI.  GENERAL APPEARANCE:  Alert, in no distress   CHEST/RESP:  respiratory effort normal, lung sounds CTA , no respiratory distress  CV:  Rate reg, rhythm reg, no murmur, no peripheral edema   PSYCH:  Alert and oriented to person-place-time , affect pleasant , judgement appropriate       SNF labs: no labs done at TCU    DISCHARGE PLAN:    Follow up labs: No labs orders/due    Medical Follow Up:      Follow up with primary care provider in 5-7 days    MT referral needed and placed by this provider: No    Current [place] scheduled appointments:   NA    Discharge Services: Home Care:  Occupational Therapy, Physical Therapy, Registered Nurse, Home Health Aide and From:  [facility]    Discharge Instructions Verbalized to Patient at Discharge:     None      1. Discontinue the following- duonebs, alb nebs, MOM bisacodyl, seroquel  2. Patient may discharge to home with all current meds and treatments  3. Please send all meds home with patient.  4. F/U with PCP in 5-7 days - [facility]  5. Okay for [facility] RN/PT/OT/HHA to eval and treat.  6. Cardiac rehab after home care is done.        TOTAL DISCHARGE TIME:   Greater than 30 minutes  Electronically signed by:  RADHA Miller CNP     Documentation of Face to Face and Certification for Home Health Services    I certify that patient: Andrei Almanza is under my care and that I, or a nurse practitioner or physician's assistant working with me, had a face-to-face encounter that meets the physician face-to-face encounter requirements with this patient on: 6/6/2019.    This encounter with the patient was in whole, or in part, for the following medical condition, which is the primary reason for home health care: CHF, Pacemaker placement.    I certify that, based on my findings, the following services are medically necessary home health services: Nursing, Occupational Therapy, Physical Therapy and HHA.    My clinical findings  support the need for the above services because: Nurse is needed: To provide caregiver training to assist with: CHF, Pacemaker placement..., Occupational Therapy Services are needed to assess and treat cognitive ability and address ADL safety due to impairment in CHF, Pacemaker placement.. and Physical Therapy Services are needed to assess and treat the following functional impairments: CHF, Pacemaker placement..    Further, I certify that my clinical findings support that this patient is homebound (i.e. absences from home require considerable and taxing effort and are for medical reasons or Christianity services or infrequently or of short duration when for other reasons) because: Requires assistance of another person or specialized equipment to access medical services because patient: Requires supervision of another for safe transfer...    Based on the above findings. I certify that this patient is confined to the home and needs intermittent skilled nursing care, physical therapy and/or speech therapy.  The patient is under my care, and I have initiated the establishment of the plan of care.  This patient will be followed by a physician who will periodically review the plan of care.  Physician/Provider to provide follow up care: [facility]    Attending hospital physician (the Medicare certified [facility] provider): RADHA Miller CNP   Physician Signature: See electronic signature associated with these discharge orders.  Date: 6/6/2019